# Patient Record
Sex: FEMALE | Race: WHITE
[De-identification: names, ages, dates, MRNs, and addresses within clinical notes are randomized per-mention and may not be internally consistent; named-entity substitution may affect disease eponyms.]

---

## 2018-06-05 NOTE — EDM.PDOC
ED HPI GENERAL MEDICAL PROBLEM





- General


Chief Complaint: Cardiovascular Problem


Stated Complaint: BLOOD PRESSURE


Time Seen by Provider: 06/05/18 19:37


Source of Information: Reports: Patient


History Limitations: Reports: No Limitations





- History of Present Illness


INITIAL COMMENTS - FREE TEXT/NARRATIVE: 





Presents reporting she is out of her blood pressure pills.  She states that the 

clinic was not able to get her in until June 28 and the other clinic that she 

tried in [place] would not take her insurance because it was out of network.  

Her blood pressure has been elevated at home and she has had some headaches and 

ringing in the ears because of that.  He has been on carvedilol 12.5 mg and 

hydrochlorothiazide 12.5 mg daily.  No chest pain, shortness of breath or 

swelling in the ankles.


  ** HEAD


Pain Score (Numeric/FACES): 7





- Related Data


 Allergies











Allergy/AdvReac Type Severity Reaction Status Date / Time


 


No Known Allergies Allergy   Verified 06/05/18 19:29











Home Meds: 


 Home Meds





Carvedilol 1 tab PO BEDTIME 06/05/18 [History]


Carvedilol 12.5 mg PO DAILY #30 tablet 06/05/18 [Rx]


Hydrochlorothiazide 1 tab PO DAILY 06/05/18 [History]


Hydrochlorothiazide 12.5 mg PO DAILY #30 cap 06/05/18 [Rx]











ED ROS GENERAL





- Review of Systems


Review Of Systems: ROS reveals no pertinent complaints other than HPI.





ED EXAM, GENERAL





- Physical Exam


Exam: See Below


Exam Limited By: No Limitations


General Appearance: Alert, No Apparent Distress


Ears: Normal External Exam


Nose: Normal Inspection


Throat/Mouth: Normal Inspection


Head: Atraumatic, Normocephalic


Neck: Normal Inspection


Respiratory/Chest: No Respiratory Distress, Lungs Clear, Normal Breath Sounds


Cardiovascular: Normal Peripheral Pulses, Regular Rate, Rhythm, No Edema, No 

Murmur


Peripheral Pulses: 3+: Radial (L), Radial (R), Posterior Tibial (L), Posterior 

Tibial (R)


GI/Abdominal: Soft


Neurological: Alert, Oriented, CN II-XII Intact, Normal Cognition, Normal Gait, 

No Motor/Sensory Deficits


Psychiatric: Normal Affect, Normal Mood


Skin Exam: Warm, Dry, Intact, Normal Color, No Rash


Lymphatic: No Adenopathy





Course





- Vital Signs


Last Recorded V/S: 





 Last Vital Signs











Temp  36.3 C   06/05/18 19:23


 


Pulse  79   06/05/18 19:23


 


Resp  18   06/05/18 19:23


 


BP  136/83   06/05/18 19:53


 


Pulse Ox  95   06/05/18 19:23














Departure





- Departure


Time of Disposition: 20:02


Disposition: Home, Self-Care 01


Condition: Good


Clinical Impression: 


Hypertension


Qualifiers:


 Hypertension type: essential hypertension Qualified Code(s): I10 - Essential (

primary) hypertension





Referrals: 


PCP,None [Primary Care Provider] - 


Additional Instructions: 


1.  Follow up with primary care at [facility] as previously scheduled.


2.  Take your prescribed medications.

## 2019-01-12 NOTE — EDM.PDOC
ED HPI GENERAL MEDICAL PROBLEM





- General


Chief Complaint: General


Stated Complaint: PT TAMPON INSERTED INCORRECTLY


Time Seen by Provider: 19 21:19


Source of Information: Reports: Patient


History Limitations: Reports: No Limitations





- History of Present Illness


INITIAL COMMENTS - FREE TEXT/NARRATIVE: 


HISTORY AND PHYSICAL:





History of present illness:


Patient is a 43-year-old female who presents to the emergency room today with 

complaints of retained tampon. She states this afternoon she had inserted a 

tampon and proceeded to take a shower. She was intimate shortly after and had 

forgotten that she had a tampon in place. She went to the bathroom to remove 

the tampon and was unable to find the string. States the tampon has only been 

in the vagina for approx 4-5 hours. 





She denies any fever, chills, chest pain, shortness of breath or cough. Denies 

any abdominal pain, nausea, vomiting, diarrhea, constipation or dysuria. She 

does routinely see her primary care provider and states that she did have a 

routine pelvic/Pap smear done within the last 6-8 months which was normal.





Review of systems: 


As per history of present illness and below otherwise all systems reviewed and 

negative.





Past medical history: 


As per history of present illness and as reviewed below otherwise 

noncontributory.





Surgical history: 


As per history of present illness and as reviewed below otherwise 

noncontributory.





Social history: 


See social history for further information





Family history: 


As per history of present illness and as reviewed below otherwise 

noncontributory.





Physical exam:


General: Well-developed and well-nourished 43-year-old female. Alert and 

oriented. Nontoxic appearing and in no acute distress.


HEENT: Atraumatic, normocephalic, pupils equal and reactive bilaterally, 

negative for conjunctival pallor or scleral icterus, mucous membranes moist, 

throat clear, neck supple, nontender, trachea midline. No drooling or trismus 

noted. No meningeal signs


Lungs: Clear to auscultation, breath sounds equal bilaterally, chest nontender.


Heart: S1S2, regular rate and rhythm without overt murmur


Abdomen: Soft, nondistended, nontender. Negative for masses or 

hepatosplenomegaly. Negative for costovertebral tenderness.


Pelvis: Stable nontender.


Genitourinary: This was done with consent chaperone at the bedside. Normal-

appearing external genitalia. There is a side-lying tampon in the vagina. Was 

able to remove easily without difficulty. Remainder of the pelvic exam is 

within normal limits. She tolerated well.


Rectal: Deferred.


Skin: Intact, warm, dry. No lesions or rashes noted.


Extremities: Atraumatic, negative for cords or calf pain. Neurovascular 

unremarkable.


Neuro: Awake, alert, oriented. Cranial nerves II through XII unremarkable. 

Cerebellum unremarkable. Motor and sensory unremarkable throughout. Exam 

nonfocal.





Notes:


Besides the pelvic exam and removal of the retained tampon, patient offers no 

other concerns or complaints. She declines the need for any further evaluation 

or diagnostics. Supportive care measures were reviewed and discussed. She 

voices understanding and is agreeable to plan of care. Denies any further 

questions or concerns at this time.





Diagnostics:


None





Therapeutics:


None





Prescription:


None





Impression: 


Foreign body removal of the vagina





Plan:


1. Please follow-up with your primary care provider and/or OB/GYN as needed. 


2. Return to the ED as needed and as discussed.





Definitive disposition and diagnosis as appropriate pending reevaluation and 

review of above.





  ** pelvis


Pain Score (Numeric/FACES): 5





- Related Data


 Allergies











Allergy/AdvReac Type Severity Reaction Status Date / Time


 


No Known Allergies Allergy   Verified 19 20:52











Home Meds: 


 Home Meds





Carvedilol 12.5 mg PO DAILY #30 tablet 18 [Rx]


hydroCHLOROthiazide [Hydrochlorothiazide] 12.5 mg PO DAILY #30 cap 18 [Rx]


Montelukast [Singulair] 1 tab PO DAILY 19 [History]


Pantoprazole [ProTONIX***] 1 tab PO DAILY 19 [History]


metFORMIN [Glucophage XR] 1 tab PO BID 19 [History]











Past Medical History


Cardiovascular History: Reports: Hypertension


Respiratory History: Reports: Asthma


OB/GYN History: Reports: Pregnancy


Endocrine/Metabolic History: Reports: Obesity/BMI 30+





- Past Surgical History


GI Surgical History: Reports: Cholecystectomy


Female  Surgical History: Reports:  Section


Musculoskeletal Surgical History: Reports: Arthroscopic Knee





Social & Family History





- Family History


Family Medical History: Noncontributory





- Tobacco Use


Smoking Status *Q: Never Smoker





- Recreational Drug Use


Recreational Drug Use: No





ED ROS GENERAL





- Review of Systems


Review Of Systems: ROS reveals no pertinent complaints other than HPI.





ED EXAM, GENERAL





- Physical Exam


Exam: See Below (See dictation)





Course





- Vital Signs


Last Recorded V/S: 


 Last Vital Signs











Temp  98.1 F   19 21:00


 


Pulse  92   19 21:00


 


Resp  18   19 21:00


 


BP  143/94 H  19 21:00


 


Pulse Ox  98   19 21:00














Departure





- Departure


Time of Disposition: 21:20


Disposition: Home, Self-Care 


Clinical Impression: 


Retained tampon


Qualifiers:


 Encounter type: initial encounter Qualified Code(s): T19.2XXA - Foreign body 

in vulva and vagina, initial encounter








- Discharge Information


Instructions:  Vaginal Foreign Body, Easy-to-Read


Referrals: 


PCP,None [Primary Care Provider] - 


Forms:  ED Department Discharge


Additional Instructions: 


The following information is given to patients seen in the emergency department 

who are being discharged to home. This information is to outline your options 

for follow-up care. We provide all patients seen in our emergency department 

with a follow-up referral.





The need for follow-up, as well as the timing and circumstances, are variable 

depending upon the specifics of your emergency department visit.





If you don't have a primary care physician on staff, we will provide you with a 

referral. We always advise you to contact your personal physician following an 

emergency department visit to inform them of the circumstance of the visit and 

for follow-up with them and/or the need for any referrals to a consulting 

specialist.





The emergency department will also refer you to a specialist when appropriate. 

This referral assures that you have the opportunity for follow-up care with a 

specialist. All of these measure are taken in an effort to provide you with 

optimal care, which includes your follow-up.





Under all circumstances we always encourage you to contact your private 

physician who remains a resource for coordinating your care. When calling for 

follow-up care, please make the office aware that this follow-up is from your 

recent emergency room visit. If for any reason you are refused follow-up, 

please contact the [facility] Emergency 

Department at [phone] and asked to speak to the emergency department 

charge nurse.





[facility]


Primary Care


[address] [place] [id number]


Phone: [phone]


Fax: [phone]





[facility]


[address] [place] [address]


Phone: [phone]


Fax: [phone]





1. Please follow-up with your primary care provider and/or OB/GYN as needed. 


2. Return to the ED as needed and as discussed.

## 2019-03-15 NOTE — PCM.OPNOTE
- General Post-Op/Procedure Note


Date of Surgery/Procedure: 03/15/19


Operative Procedure(s): bilateral reduction mammoplasty


Pre Op Diagnosis: bilateral macromastia


Post-Op Diagnosis: Same


Anesthesia Technique: Local, MAC


Primary Surgeon: Augusta Carrasquillo


Assistant: Eva Marti


Role of Assistant: 





retraction prepping draping and closure assistance


Complications: None


Condition: Good


Free Text/Narrative:: 


 Intake & Output











 03/15/19 03/15/19 03/15/19





 07:59 15:59 23:59


 


Intake Total  4000 


 


Output Total  410 


 


Balance  3590

## 2019-03-15 NOTE — PCM.POSTAN
POST ANESTHESIA ASSESSMENT





- MENTAL STATUS


Mental Status: Alert, Oriented





- VITAL SIGNS


Pulse Rate: 80


SaO2: 92 (2 LPM NC)


Resp Rate: 14





- RESPIRATORY


Respiratory Status: Respiratory Rate WNL, Airway Patent, O2 Saturation Stable





- CARDIOVASCULAR


CV Status: Pulse Rate WNL, Blood Pressure Stable





- GASTROINTESTINAL


GI Status: No Symptoms





- POST OP HYDRATION


Hydration Status: Adequate & Stable

## 2019-03-15 NOTE — PCM.PREANE
Preanesthetic Assessment





- Anesthesia/Transfusion/Family Hx


Anesthesia History: Prior Anesthesia Without Reaction


Family History of Anesthesia Reaction: No


Transfusion History: No Prior Transfusion(s)





- Review of Systems


General: No Symptoms


Pulmonary: No Symptoms


Cardiovascular: No Symptoms


Gastrointestinal: No Symptoms


Neurological: No Symptoms


Other: Reports: None





- Physical Assessment


NPO Status Date: 19


Height: 5 ft 2 in


Weight: 108.862 kg


ASA Class: 2


Mental Status: Alert & Oriented x3


Airway Class: Mallampati = 2


Dentition: Reports: Broken Tooth/Teeth


ROM/Head Extension: Full


Lungs: Clear to Auscultation, Normal Respiratory Effort


Cardiovascular: Regular Rate, Regular Rhythm





- Allergies


Allergies/Adverse Reactions: 


 Allergies











Allergy/AdvReac Type Severity Reaction Status Date / Time


 


No Known Allergies Allergy   Verified 19 07:52














- Blood


Blood Available: No





- Anesthesia Plan


Pre-Op Medication Ordered: None





- Acknowledgements


Anesthesia Type Planned: General Anesthesia


Pt an Appropriate Candidate for the Planned Anesthesia: Yes


Alternatives and Risks of Anesthesia Discussed w Pt/Guardian: Yes


Pt/Guardian Understands and Agrees with Anesthesia Plan: Yes


Additional Comments: 





PMH: MO, Asthma, HTN, metabolic syndrome-rx:metformin


PLAN: GET





PreAnesthesia Questionnaire


HEENT History: Reports: Allergic Rhinitis, Hard of Hearing, Impaired Vision, 

Other (See Below)


Other HEENT History: wears contacts, left hearing aid


Cardiovascular History: Reports: Hypertension


Respiratory History: Reports: Asthma


Other Respiratory History: states has not used her inhaler for years


Gastrointestinal History: Reports: GERD


Genitourinary History: Reports: None


OB/GYN History: Reports: Pregnancy


Musculoskeletal History: Reports: Neck Pain, Chronic


Neurological History: Reports: None


Psychiatric History: Reports: Anxiety, Depression


Endocrine/Metabolic History: Reports: Obesity/BMI 30+


Other Endocrine/Metabolic History: metabolic syndrome


Hematologic History: Reports: None


Immunologic History: Reports: None


Oncologic (Cancer) History: Reports: None


Dermatologic History: Reports: None





- Past Surgical History


Head Surgeries/Procedures: Reports: None


GI Surgical History: Reports: Cholecystectomy


Female  Surgical History: Reports:  Section


Neurological Surgical History: Reports: None


Musculoskeletal Surgical History: Reports: Arthroscopic Knee, Carpal Tunnel


Oncologic Surgical History: Reports: None


Dermatological Surgical History: Reports: None





- SUBSTANCE USE


Smoking Status *Q: Never Smoker


Recreational Drug Use History: No





- HOME MEDS


Home Medications: 


 Home Meds





hydroCHLOROthiazide [Hydrochlorothiazide] 12.5 mg PO DAILY #30 cap 18 [Rx]


Montelukast [Singulair] 1 tab PO DAILY 19 [History]


Pantoprazole [ProTONIX***] 1 tab PO DAILY 19 [History]


metFORMIN [Glucophage XR] 1 tab PO BID 19 [History]


Cetirizine [ZyrTEC] 10 mg PO BEDTIME 19 [History]


Levocetirizine Dihydrochloride [Xyzal] 5 mg PO DAILY 19 [History]


Sertraline HCl 100 mg PO DAILY 19 [History]


Carvedilol 12.5 mg PO BEDTIME 19 [History]











- CURRENT (IN HOUSE) MEDS


Current Meds: 





 Current Medications





Hydrocodone Bitart/Acetaminophen (Norco 325-5 Mg)  1 tab PO Q4H PRN


   PRN Reason: Pain


Lactated Ringer's (Ringers, Lactated)  1,000 mls @ 125 mls/hr IV ASDIRECTED JODY





Discontinued Medications





Bupivacaine HCl/Epinephrine Bitart (Marcaine 0.25%/Epinephrine 1:200,000)  30 

ml INJECT ONETIME ONE


   Stop: 03/15/19 08:01


Cefazolin Sodium/Dextrose 2 gm (/ Premix)  50 mls @ 100 mls/hr IV ONETIME ONE


   Stop: 03/15/19 08:29

## 2019-03-16 NOTE — PCM48HPAN
Post Anesthesia Note





- EVALUATION WITHIN 48HRS OF ANESTHETIC


Vital Signs in Normal Range: Yes


Patient Participated in Evaluation: Yes


Respiratory Function Stable: Yes


Airway Patent: Yes


Cardiovascular Function Stable: Yes


Hydration Status Stable: Yes


Pain Control Satisfactory: Yes


Nausea and Vomiting Control Satisfactory: Yes


Mental Status Recovered: Yes


Pulse Rate: 88


Resp Rate: 18


Blood Pressure: 134/73





- COMMENTS/OBSERVATIONS


Free Text/Narrative:: 


Pts nausea is improving and able to take PO fluids and solids.  Pts pain remain 

tolerable.  Pt ready for discharge.

## 2019-03-16 NOTE — OR
SURGEON:

GABO KENNEY MD

 

DATE OF PROCEDURE:  03/15/2019

 

PREOPERATIVE DIAGNOSIS:

Bilateral macromastia.

 

POSTOPERATIVE DIAGNOSIS:

Bilateral macromastia.

 

PROCEDURE:

Bilateral reduction mammoplasty.

 

ASSISTANT:

ARTIE Ravi

 

REASON FOR AND ROLE OF ASSISTANT:

Retraction, prepping, draping, positioning and closure assistance.

 

INDICATIONS:

Ms. Hernandez is a 43-year-old female with macromastia and significant symptoms

associated.  Risks and benefits were discussed with her including, but not

limited to, bleeding, infection, damage to underlying or overlying structures,

possible need for future interventions, possible scarring.  This will be done

for medical necessity.

 

PROCEDURE IN DETAIL:

After informed consent was obtained and placed on the chart, the patient was

brought to the operating theater and laid in supine position.  After adequate

general anesthesia was obtained, the area was prepped and draped, and time-out

was completed to confirm side and site.

 

Attention was then paid to time-out to confirm side and site and then the

preoperative markings done in the preanesthesia area were confirmed and just

slightly for new inferior pedicle.  Once the inferior pedicle was designed, it

was de-epithelialized using a breast tourniquet and a 15 blade taking care to

spare the nipple.  Once adequately de-epithelialized, attention was then paid to

the superior skin flaps.

The superior skin flaps were dissected 1.5 cm thick tapering to the chest wall

medially, 1.5 cm thick tapering all the way to the chest wall laterally.  They

were connected in the middle and the intervening breast tissue between this and

the inferior pedicle was removed.  The symmetry procedure was completed on the

opposite side and the skin was redraped bilaterally and stapled in position.

The patient was then sat up into the supine position and the left breast was

appreciated to be somewhat larger.  Additional adjustments were made here and

additional breast tissue was taken.  The patient was then sat up again and

symmetry was appreciated.

 

Once back in supine position, the new position of the nipple-areolar complex had

been marked while sitting up, and these were dissected and the nipple-areolar

complex was brought through and stapled in place.  Once this was completed,

attention was then paid to closure.

 

A 3-0 Monocryl Stratafix in a deep dermal fashion was used to close the dermis

and a running subcuticular 4-0 Stratafix for the skin and was used to complete

the closure.  Once this was done, the wounds were dressed with Steri-Strips.

The patient tolerated this well with some inverting of the left nipple, which

was adjusted well to pop out.  This is most likely positioning of the patient.

Once adequately dressed with fluffs, Kerlix, and a compression bra, the patient

was transferred to the PACU in stable condition.  

 

FOLLOW UP: She will be maintained with

this overnight as she is quite sleepy and has had some low 90s oxygen

saturations in PACU.  

 

 

ALMAZ / MAYELIN

DD:  03/16/2019 09:52:24

DT:  03/16/2019 13:04:08

Job #:  [id number]

BEVERLY

## 2019-04-06 NOTE — PCM.HP
H&P History of Present Illness





- General


Date of Service: 19


Admit Problem/Dx: 


 Admission Diagnosis/Problem





Admission Diagnosis/Problem      Cellulitis








Source of Information: Patient


History Limitations: Reports: No Limitations





- History of Present Illness


Initial Comments - Free Text/Narative: 





Patient noted increased redness of the right breast yesterday with some 

drainage.  She states she woke up wet and this morning feeling febrile and 

chilled.  She has recently had someone in her family with influenza type illness

, but denies any of those symptoms. She feels very tired.  Focal redness of the 

right breast at the T junction more than the left breast.  No streaking or 

signs of deeper involvement.  She tried to milk fluid for us today and there is 

nothing draining at this time.  We will try to obtain a culture of the area but 

it may be limited given the lack of any purulence or drainage.   





We discussed that there are two options - one being IV antibiotics here and 

then discharge on oral or keeping for observation overnight.  Given the 

rapidity of the onset and curious timing of 3 weeks out from surgery, this is 

quite atypical.  I would recommend coming in overnight for IV antibiotics just 

to be safe. She understands and agrees.  


Onset of Symptoms: Reports: Sudden


Duration of Symptoms: Reports: Day(s): (1)


Location: Reports: Chest (right breast)


Quality: Reports: Pressure, Throbbing


Severity: Moderate


Improves with: Reports: None


Worsens with: Reports: None


Context: Reports: Sick Contact.  Denies: Trauma


Associated Symptoms: Reports: Fever/Chills, Headaches, Malaise.  Denies: Nausea/

Vomiting, Rash, Shortness of Breath


  ** Right Breast


Pain Score (Numeric/FACES): 3





- Related Data


Allergies/Adverse Reactions: 


 Allergies











Allergy/AdvReac Type Severity Reaction Status Date / Time


 


No Known Allergies Allergy   Verified 19 13:39











Home Medications: 


 Home Meds





hydroCHLOROthiazide [Hydrochlorothiazide] 12.5 mg PO DAILY #30 cap 18 [Rx]


Montelukast [Singulair] 1 tab PO DAILY 19 [History]


Pantoprazole [ProTONIX***] 1 tab PO DAILY 19 [History]


metFORMIN [Glucophage XR] 1 tab PO BID 19 [History]


Cetirizine [ZyrTEC] 10 mg PO BEDTIME 19 [History]


Levocetirizine Dihydrochloride [Xyzal] 5 mg PO DAILY 19 [History]


Sertraline HCl 100 mg PO DAILY 19 [History]


Carvedilol 12.5 mg PO BEDTIME 19 [History]


Acetaminophen/HYDROcodone [Norco 325-5 MG] 1 tab PO Q4H PRN #30 tablet 03/15/19 

[Rx]











Past Medical History


HEENT History: Reports: Allergic Rhinitis, Hard of Hearing, Impaired Vision, 

Other (See Below)


Other HEENT History: wears contacts, left hearing aid


Cardiovascular History: Reports: Hypertension


Respiratory History: Reports: Asthma


Other Respiratory History: states has not used her inhaler for years


Gastrointestinal History: Reports: GERD


Genitourinary History: Reports: None


OB/GYN History: Reports: Pregnancy


Musculoskeletal History: Reports: Neck Pain, Chronic


Neurological History: Reports: None


Psychiatric History: Reports: Anxiety, Depression


Endocrine/Metabolic History: Reports: Obesity/BMI 30+


Other Endocrine/Metabolic History: metabolic syndrome


Hematologic History: Reports: None


Immunologic History: Reports: None


Oncologic (Cancer) History: Reports: None


Dermatologic History: Reports: None





- Infectious Disease History


Infectious Disease History: Reports: None





- Past Surgical History


Head Surgeries/Procedures: Reports: None


GI Surgical History: Reports: Cholecystectomy


Female  Surgical History: Reports:  Section


Neurological Surgical History: Reports: None


Musculoskeletal Surgical History: Reports: Arthroscopic Knee, Carpal Tunnel


Oncologic Surgical History: Reports: None


Dermatological Surgical History: Reports: None





Social & Family History





- Family History


Family Medical History: Noncontributory





- Tobacco Use


Smoking Status *Q: Never Smoker





- Caffeine Use


Caffeine Use: Reports: Coffee





- Recreational Drug Use


Recreational Drug Use: No





H&P Review of Systems





- Review of Systems:


Review Of Systems: See Below


General: Reports: Fever, Chills, Malaise, Fatigue, Night Sweats.  Denies: 

Weakness


Pulmonary: Reports: No Symptoms


Cardiovascular: Reports: No Symptoms


Musculoskeletal: Reports: No Symptoms


Skin: Reports: Erythema


Psychiatric: Reports: No Symptoms


Neurological: Reports: No Symptoms





Exam





- Exam


Exam: See Below





- Vital Signs


Vital Signs: 


 Last Vital Signs











Temp  97.1 F   19 13:39


 


Pulse  92   19 13:39


 


Resp  16   19 13:39


 


BP  136/78   19 13:39


 


Pulse Ox  97   19 13:39











Weight: 230 lb





- Exam


General: Alert, Oriented, Cooperative


HEENT: EOMI


Lungs: Normal Respiratory Effort


Cardiovascular: Regular Rate


Extremities: Normal Range of Motion


Skin: Warm, Dry, Wound (left lateral breast has the small opening we noted 

about 2cm and it is healing well.  No signs of infection here.  Left breast 

looks great.  Right breast with redness and swelling at the central t junction 

area.  Moderate redness - no drainage at this time. No open wounds - small t 

junction area with 0.5cm opening. )


Neuro Extensive - Mental Status: Alert, Oriented x3


Psychiatric: Alert, Normal Affect, Normal Mood





- Patient Data


Lab Results Last 24 hrs: 


 Laboratory Results - last 24 hr











  19 Range/Units





  14:18 14:18 14:32 


 


WBC  16.29 H    (4.0-11.0)  K/uL


 


RBC  4.30    (4.30-5.90)  M/uL


 


Hgb  12.4    (12.0-16.0)  g/dL


 


Hct  37.6    (36.0-46.0)  %


 


MCV  87.4    (80.0-98.0)  fL


 


MCH  28.8    (27.0-32.0)  pg


 


MCHC  33.0    (31.0-37.0)  g/dL


 


RDW Std Deviation  42.5    (28.0-62.0)  fl


 


RDW Coeff of Karma  13    (11.0-15.0)  %


 


Plt Count  262    (150-400)  K/uL


 


MPV  9.40    (7.40-12.00)  fL


 


Neut % (Auto)  62.1    (48.0-80.0)  %


 


Lymph % (Auto)  21.1    (16.0-40.0)  %


 


Mono % (Auto)  6.7    (0.0-15.0)  %


 


Eos % (Auto)  9.7 H    (0.0-7.0)  %


 


Baso % (Auto)  0.4    (0.0-1.5)  %


 


Neut # (Auto)  10.1 H    (1.4-5.7)  K/uL


 


Lymph # (Auto)  3.4 H    (0.6-2.4)  K/uL


 


Mono # (Auto)  1.1 H    (0.0-0.8)  K/uL


 


Eos # (Auto)  1.6 H    (0.0-0.7)  K/uL


 


Baso # (Auto)  0.1    (0.0-0.1)  K/uL


 


Nucleated RBC %  0.0    /100WBC


 


Nucleated RBCs #  0    K/uL


 


Lactate    0.9  (0.20-2.00)  mmol/L


 


Sodium   139   (136-145)  mmol/L


 


Potassium   3.4 L   (3.5-5.1)  mmol/L


 


Chloride   102   ()  mmol/L


 


Carbon Dioxide   26.8   (21.0-32.0)  mmol/L


 


BUN   14   (7.0-18.0)  mg/dL


 


Creatinine   0.9   (0.6-1.0)  mg/dL


 


Est Cr Clr Drug Dosing   76.23   mL/min


 


Estimated GFR (MDRD)   > 60.0   ml/min


 


Glucose   98   ()  mg/dL


 


Calcium   9.1   (8.5-10.1)  mg/dL


 


Total Bilirubin   0.7   (0.2-1.0)  mg/dL


 


AST   16   (15-37)  IU/L


 


ALT   32   (14-63)  IU/L


 


Alkaline Phosphatase   54   ()  U/L


 


Total Protein   7.7   (6.4-8.2)  g/dL


 


Albumin   3.6   (3.4-5.0)  g/dL


 


Globulin   4.1 H   (2.6-4.0)  g/dL


 


Albumin/Globulin Ratio   0.9   (0.9-1.6)  











Result Diagrams: 


 19 14:18





 19 14:18





*Q Meaningful Use (ADM)





- VTE *Q


VTE Anticoagulation Contraindications: Med/TX Not Indicated/Need





- VTE Risk Assess *Q


Each Risk Factor Represents 1 Point: Age 41 - 59 years


Total Score 1 Point Risk Factors: 1


Each Risk Factor Represents 2 Points: Major surgery greater than 45 minutes


Total Score 2 Point Risk Factors: 2


Each Risk Factor Represents 3 Points: None


Total Score 3 Point Risk Factors: 0


Each Risk Factor Represents 5 Points: None


Total Score 5 Point Risk Factors: 0


Venous Thromboembolism Risk Factor Score *Q: 3





- Problem List


(1) Cellulitis


SNOMED Code(s): 989866635


   ICD Code: L03.90 - CELLULITIS, UNSPECIFIED   Status: Acute   Current Visit: 

Yes   


Qualifiers: 


   Laterality: right 


Problem List Initiated/Reviewed/Updated: Yes


Orders Last 24hrs: 


 Active Orders 24 hr











 Category Date Time Status


 


 Patient Status [ADT] Stat ADT  19 16:10 Active


 


 Vital Signs [RC] PER UNIT ROUTINE Care  19 16:10 Active


 


 Regular Diet [DIET] Diet  19 Breakfast Active


 


 CULTURE BLOOD [BC] Stat Lab  19 14:32 Received


 


 CULTURE BLOOD [BC] Stat Lab  19 14:43 Received


 


 CULTURE WOUND [RM] Stat Lab  19 16:16 Ordered


 


 Acetaminophen [Tylenol] Med  19 16:10 Ordered





 650 mg PO Q6H PRN   


 


 Acetaminophen/HYDROcodone [Norco 325-5 MG] Med  19 16:10 Ordered





 1 tab PO Q4H PRN   


 


 Ibuprofen [Motrin] Med  19 16:10 Ordered





 600 mg PO Q6H PRN   


 


 Lactated Ringers [Ringers, Lactated] 1,000 ml Med  19 16:15 Ordered





 IV ASDIRECTED   


 


 Ondansetron [Zofran ODT] Med  19 16:10 Ordered





 4 mg PO Q4H PRN   


 


 Piperacillin/Tazobactam [Piperacil-Tazobact] 3.375 gm Med  19 16:15 

Ordered





 Sodium Chloride 0.9% [Normal Saline] 50 ml   





 IV Q8H   


 


 Sodium Chloride 0.9% [Saline Flush] Med  19 13:59 Active





 10 ml FLUSH ASDIRECTED PRN   


 


 Sodium Chloride 0.9% [Saline Flush] Med  19 13:59 Active





 2.5 ml FLUSH ASDIRECTED PRN   


 


 Vancomycin [Vancocin] 1 gm Med  19 16:15 Ordered





 Sodium Chloride 0.9% [Normal Saline] 250 ml   





 IV Q12H   


 


 Blood Culture x2 Reflex Set [OM.PC] Stat Oth  19 13:58 Ordered


 


 Saline Lock Insert [OM.PC] Stat Oth  19 13:58 Ordered








 Medication Orders





Acetaminophen (Tylenol)  650 mg PO Q6H PRN


   PRN Reason: Pain (mild 1-3)


Hydrocodone Bitart/Acetaminophen (Norco 325-5 Mg)  1 tab PO Q4H PRN


   PRN Reason: Pain (moderate 4-6)


Lactated Ringer's (Ringers, Lactated)  1,000 mls @ 75 mls/hr IV ASDIRECTED JODY


Piperacillin Sod/Tazobactam (Sod 3.375 gm/ Sodium Chloride)  50 mls @ 100 mls/

hr IV Q8H JODY


Vancomycin HCl 1 gm/ Sodium (Chloride)  250 mls @ 166 mls/hr IV Q12H JODY


Ibuprofen (Motrin)  600 mg PO Q6H PRN


   PRN Reason: Pain


Ondansetron HCl (Zofran Odt)  4 mg PO Q4H PRN


   PRN Reason: Nausea/Vomiting


Sodium Chloride (Saline Flush)  10 ml FLUSH ASDIRECTED PRN


   PRN Reason: Keep Vein Open


Sodium Chloride (Saline Flush)  2.5 ml FLUSH ASDIRECTED PRN


   PRN Reason: Keep Vein Open








Assessment/Plan Comment:: 





we will be very aggressive in treatment given the suspicious timing and we will 

start IV antibiotics.  


pain control


observation and normal diet


cultures obtained by physician in er at 430pm

## 2019-04-06 NOTE — PCM.SN
- Free Text/Narrative


Note: 





called for worsening cellulitis.  Marginally worse but not a tremendous amount.

  Delay in getting antibiotics started, but now has had both.  Very high 

anxiety and discussed anxiolytic but she is not interested and denies that this 

is contributing.  She has been somewhat hyper-anxiety and a higher pain level 

than most other people who have this surgery.  We discussed that this appears 

to be normal cellulitis and she can work on pain control.  She has not tried 

the ibuprofen and we will recommend this.  In addition we can try morphine if 

needed.  Recheck in am.

## 2019-04-07 NOTE — PCM.PN
- General Info


Date of Service: 04/07/19


Admission Dx/Problem (Free Text): 


 Admission Diagnosis/Problem





Admission Diagnosis/Problem      Cellulitis








Functional Status: Reports: Pain Controlled, Tolerating Diet, Ambulating, 

Urinating.  Denies: New Symptoms





- Review of Systems


General: Denies: Fever, Chills


HEENT: Reports: No Symptoms


Pulmonary: Reports: No Symptoms


Musculoskeletal: Reports: No Symptoms


Skin: Reports: Other (still redness but much improved. )





- Patient Data


Vitals - Most Recent: 


 Last Vital Signs











Temp  98.7 F   04/07/19 07:53


 


Pulse  80   04/07/19 07:53


 


Resp  16   04/07/19 07:53


 


BP  112/40 L  04/07/19 07:53


 


Pulse Ox  96   04/07/19 07:53











Weight - Most Recent: 14 lb 14 oz


I&O - Last 24 Hours: 


 Intake & Output











 04/06/19 04/07/19 04/07/19





 23:59 07:59 15:59


 


Intake Total 1300 1400 50


 


Output Total  700 


 


Balance 1300 700 50











Lab Results Last 24 Hours: 


 Laboratory Results - last 24 hr











  04/06/19 04/06/19 04/06/19 Range/Units





  14:18 14:18 14:32 


 


WBC  16.29 H    (4.0-11.0)  K/uL


 


RBC  4.30    (4.30-5.90)  M/uL


 


Hgb  12.4    (12.0-16.0)  g/dL


 


Hct  37.6    (36.0-46.0)  %


 


MCV  87.4    (80.0-98.0)  fL


 


MCH  28.8    (27.0-32.0)  pg


 


MCHC  33.0    (31.0-37.0)  g/dL


 


RDW Std Deviation  42.5    (28.0-62.0)  fl


 


RDW Coeff of Karma  13    (11.0-15.0)  %


 


Plt Count  262    (150-400)  K/uL


 


MPV  9.40    (7.40-12.00)  fL


 


Neut % (Auto)  62.1    (48.0-80.0)  %


 


Lymph % (Auto)  21.1    (16.0-40.0)  %


 


Mono % (Auto)  6.7    (0.0-15.0)  %


 


Eos % (Auto)  9.7 H    (0.0-7.0)  %


 


Baso % (Auto)  0.4    (0.0-1.5)  %


 


Neut # (Auto)  10.1 H    (1.4-5.7)  K/uL


 


Lymph # (Auto)  3.4 H    (0.6-2.4)  K/uL


 


Mono # (Auto)  1.1 H    (0.0-0.8)  K/uL


 


Eos # (Auto)  1.6 H    (0.0-0.7)  K/uL


 


Baso # (Auto)  0.1    (0.0-0.1)  K/uL


 


Nucleated RBC %  0.0    /100WBC


 


Nucleated RBCs #  0    K/uL


 


Lactate    0.9  (0.20-2.00)  mmol/L


 


Sodium   139   (136-145)  mmol/L


 


Potassium   3.4 L   (3.5-5.1)  mmol/L


 


Chloride   102   ()  mmol/L


 


Carbon Dioxide   26.8   (21.0-32.0)  mmol/L


 


BUN   14   (7.0-18.0)  mg/dL


 


Creatinine   0.9   (0.6-1.0)  mg/dL


 


Est Cr Clr Drug Dosing   76.23   mL/min


 


Estimated GFR (MDRD)   > 60.0   ml/min


 


Glucose   98   ()  mg/dL


 


Calcium   9.1   (8.5-10.1)  mg/dL


 


Total Bilirubin   0.7   (0.2-1.0)  mg/dL


 


AST   16   (15-37)  IU/L


 


ALT   32   (14-63)  IU/L


 


Alkaline Phosphatase   54   ()  U/L


 


Total Protein   7.7   (6.4-8.2)  g/dL


 


Albumin   3.6   (3.4-5.0)  g/dL


 


Globulin   4.1 H   (2.6-4.0)  g/dL


 


Albumin/Globulin Ratio   0.9   (0.9-1.6)  














  04/07/19 Range/Units





  08:38 


 


WBC  12.44 H  (4.0-11.0)  K/uL


 


RBC  3.81 L  (4.30-5.90)  M/uL


 


Hgb  11.1 L  (12.0-16.0)  g/dL


 


Hct  33.8 L  (36.0-46.0)  %


 


MCV  88.7  (80.0-98.0)  fL


 


MCH  29.1  (27.0-32.0)  pg


 


MCHC  32.8  (31.0-37.0)  g/dL


 


RDW Std Deviation  42.9  (28.0-62.0)  fl


 


RDW Coeff of Karma  13  (11.0-15.0)  %


 


Plt Count  232  (150-400)  K/uL


 


MPV  8.60  (7.40-12.00)  fL


 


Neut % (Auto)  59.2  (48.0-80.0)  %


 


Lymph % (Auto)  19.6  (16.0-40.0)  %


 


Mono % (Auto)  4.8  (0.0-15.0)  %


 


Eos % (Auto)  16.2 H  (0.0-7.0)  %


 


Baso % (Auto)  0.2  (0.0-1.5)  %


 


Neut # (Auto)  7.4 H  (1.4-5.7)  K/uL


 


Lymph # (Auto)  2.4  (0.6-2.4)  K/uL


 


Mono # (Auto)  0.6  (0.0-0.8)  K/uL


 


Eos # (Auto)  2.0 H  (0.0-0.7)  K/uL


 


Baso # (Auto)  0.0  (0.0-0.1)  K/uL


 


Nucleated RBC %  0.0  /100WBC


 


Nucleated RBCs #  0  K/uL


 


Lactate   (0.20-2.00)  mmol/L


 


Sodium   (136-145)  mmol/L


 


Potassium   (3.5-5.1)  mmol/L


 


Chloride   ()  mmol/L


 


Carbon Dioxide   (21.0-32.0)  mmol/L


 


BUN   (7.0-18.0)  mg/dL


 


Creatinine   (0.6-1.0)  mg/dL


 


Est Cr Clr Drug Dosing   mL/min


 


Estimated GFR (MDRD)   ml/min


 


Glucose   ()  mg/dL


 


Calcium   (8.5-10.1)  mg/dL


 


Total Bilirubin   (0.2-1.0)  mg/dL


 


AST   (15-37)  IU/L


 


ALT   (14-63)  IU/L


 


Alkaline Phosphatase   ()  U/L


 


Total Protein   (6.4-8.2)  g/dL


 


Albumin   (3.4-5.0)  g/dL


 


Globulin   (2.6-4.0)  g/dL


 


Albumin/Globulin Ratio   (0.9-1.6)  











Galileo Results Last 24 Hours: 


 Microbiology











 04/06/19 16:30 Wound Culture - Preliminary





 Breast, Right 











Med Orders - Current: 


 Current Medications





Acetaminophen (Tylenol)  650 mg PO Q6H PRN


   PRN Reason: Pain (mild 1-3)


   Last Admin: 04/06/19 18:17 Dose:  650 mg


Hydrocodone Bitart/Acetaminophen (Norco 325-5 Mg)  1 tab PO Q4H PRN


   PRN Reason: Pain (moderate 4-6)


   Last Admin: 04/06/19 20:14 Dose:  1 tab


Carvedilol (Coreg)  12.5 mg PO BEDTIME JODY


   Last Admin: 04/06/19 23:11 Dose:  12.5 mg


Diphenhydramine HCl (Benadryl)  25 mg PO Q6H PRN


   PRN Reason: Itching


   Last Admin: 04/07/19 07:15 Dose:  25 mg


Lactated Ringer's (Ringers, Lactated)  1,000 mls @ 75 mls/hr IV ASDIRECTED [facility]


   Last Admin: 04/07/19 10:00 Dose:  75 mls/hr


Piperacillin Sod/Tazobactam (Sod 3.375 gm/ Sodium Chloride)  50 mls @ 100 mls/

hr IV Q8H [facility]


   Last Admin: 04/07/19 09:07 Dose:  100 mls/hr


Vancomycin HCl 1 gm/ Sodium (Chloride)  250 mls @ 166 mls/hr IV BID@0600,1800 

[facility]


   Last Admin: 04/07/19 05:49 Dose:  166 mls/hr


Ibuprofen (Motrin)  600 mg PO Q6H PRN


   PRN Reason: Pain


   Last Admin: 04/06/19 23:08 Dose:  600 mg


Montelukast Sodium (Singulair)  10 mg PO BEDTIME [facility]


   Last Admin: 04/06/19 23:09 Dose:  10 mg


Morphine Sulfate (Morphine)  0.5 mg IVPUSH Q2H PRN


   PRN Reason: Pain


Ondansetron HCl (Zofran Odt)  4 mg PO Q4H PRN


   PRN Reason: Nausea/Vomiting


Sodium Chloride (Saline Flush)  10 ml FLUSH ASDIRECTED PRN


   PRN Reason: Keep Vein Open


Sodium Chloride (Saline Flush)  2.5 ml FLUSH ASDIRECTED PRN


   PRN Reason: Keep Vein Open





Discontinued Medications





Sodium Chloride (Normal Saline)  1,000 mls @ 999 mls/hr IV .Bolus ONE


   Stop: 04/06/19 14:59


   Last Admin: 04/06/19 14:20 Dose:  999 mls/hr


Vancomycin HCl 1 gm/ Sodium (Chloride)  250 mls @ 166 mls/hr IV Q12H [facility]


   Last Admin: 04/06/19 21:15 Dose:  Not Given


Morphine Sulfate (Morphine)  0.5 mg IVPUSH Q2H [facility]


   Last Admin: 04/06/19 23:09 Dose:  0.5 mg











- Exam


General: Alert, Oriented, Cooperative


HEENT: Pupils Equal


Lungs: Normal Respiratory Effort


Extremities: Normal Inspection, Normal Range of Motion


Wound/Incisions: Erythema Improving (significantly better than even late last 

night.  Softening of the skin.  Discussed min-mod draiange to be serosanguinous 

and not frankly purulent and no signs of abscess formation.  )


Neurological: No New Focal Deficit, Cranial Nerves Intact


Psy/Mental Status: Alert, Normal Affect, Anxious





- Problem List & Annotations


(1) Cellulitis


SNOMED Code(s): 786299845


   Code(s): L03.90 - CELLULITIS, UNSPECIFIED   Status: Acute   Current Visit: 

Yes   


Qualifiers: 


   Laterality: right 





- Problem List Review


Problem List Initiated/Reviewed/Updated: Yes





- My Orders


Last 24 Hours: 


My Active Orders





04/06/19 16:10


Patient Status [ADT] Stat 


Vital Signs [RC] Q4H 


Acetaminophen [Tylenol]   650 mg PO Q6H PRN 


Acetaminophen/HYDROcodone [Norco 325-5 MG]   1 tab PO Q4H PRN 


Ibuprofen [Motrin]   600 mg PO Q6H PRN 


Ondansetron [Zofran ODT]   4 mg PO Q4H PRN 





04/06/19 16:15


Lactated Ringers [Ringers, Lactated] 1,000 ml IV ASDIRECTED 





04/06/19 16:30


CULTURE WOUND [RM] Stat 





04/06/19 17:00


Piperacillin/Tazobactam [Piperacil-Tazobact] 3.375 gm   Sodium Chloride 0.9% [

Normal Saline] 50 ml IV Q8H 





04/06/19 18:45


Vancomycin [Vancocin] 1 gm   Sodium Chloride 0.9% [Normal Saline] 250 ml IV BID@

0600,1800 





04/06/19 21:00


Carvedilol [Coreg]   12.5 mg PO BEDTIME 


Montelukast [Singulair]   10 mg PO BEDTIME 





04/06/19 22:38


diphenhydrAMINE [Benadryl]   25 mg PO Q6H PRN 





04/07/19 00:04


Morphine   0.5 mg IVPUSH Q2H PRN 














- Plan


Plan:: 





continue IV antibiotics pending sensitivities. Likely discharge tomorrow on 

oral antibiotics.   


pain control


observation and normal diet

## 2019-04-08 NOTE — PCM.SN
- Free Text/Narrative


Note: 





recheck this am and though the right breast is significantly better there is 

some worsening of the left breast.  This is curious given the significant 

improvements on the opposite side.  This grew pan sensitive staph.  





My recommendation is opening of the focalized redness where the incision is to 

ensure there is no abscess - it is quite firm here and I anticipate there will 

be some fluid much like the right has had some drainage.  We discussed imaging 

as well if needed, but I would proceed with the opening of the focal area prior 

to any additional scans. She would like to do this as well. 





We will return around 4pm and dose the morphine, norco and use some topical LET 

around that time as well. All questions answered.

## 2019-04-09 NOTE — PCM.PN
- General Info


Date of Service: 04/08/19


Admission Dx/Problem (Free Text): 


 Admission Diagnosis/Problem





Admission Diagnosis/Problem      Cellulitis








Functional Status: Reports: Pain Controlled, Tolerating Diet





- Review of Systems


General: Reports: No Symptoms


HEENT: Reports: No Symptoms


Musculoskeletal: Reports: No Symptoms


Skin: Reports: Other (redness on the right much improved, left has increased. )


Neurological: Reports: No Symptoms


Psychiatric: Reports: No Symptoms





- Patient Data


Vitals - Most Recent: 


 Last Vital Signs











Temp  97.3 F   04/09/19 11:54


 


Pulse  73   04/09/19 11:54


 


Resp  14   04/09/19 11:54


 


BP  103/58 L  04/09/19 11:54


 


Pulse Ox  96   04/09/19 11:54











Weight - Most Recent: 238 lb


I&O - Last 24 Hours: 


 Intake & Output











 04/08/19 04/09/19 04/09/19





 23:59 07:59 15:59


 


Intake Total 970 340 


 


Balance 970 340 











Galileo Results Last 24 Hours: 


 Microbiology











 04/06/19 14:43 Aerobic Blood Culture - Preliminary





 Blood - Venous - Lab Draw    NO GROWTH AFTER 2 DAYS





 Anaerobic Blood Culture - Preliminary





    NO GROWTH AFTER 2 DAYS


 


 04/06/19 14:32 Aerobic Blood Culture - Preliminary





 Blood - Venous    NO GROWTH AFTER 2 DAYS





 Anaerobic Blood Culture - Preliminary





    NO GROWTH AFTER 2 DAYS


 


 04/06/19 16:30 Wound Culture - Final





 Breast, Right    Staphylococcus Aureus











Med Orders - Current: 


 Current Medications





Acetaminophen (Tylenol)  650 mg PO Q6H PRN


   PRN Reason: Pain (mild 1-3)


   Last Admin: 04/06/19 18:17 Dose:  650 mg


Hydrocodone Bitart/Acetaminophen (Norco 325-5 Mg)  0 tab PO Q4H PRN


   PRN Reason: Pain (moderate 4-6)


   Last Admin: 04/09/19 08:18 Dose:  2 tab


Carvedilol (Coreg)  12.5 mg PO BEDTIME [facility]


   Last Admin: 04/08/19 22:04 Dose:  12.5 mg


Cetirizine HCl (Zyrtec)  10 mg PO BEDTIME [facility]


   Last Admin: 04/08/19 22:04 Dose:  10 mg


Diphenhydramine HCl (Benadryl)  25 mg PO Q6H PRN


   PRN Reason: Itching


   Last Admin: 04/08/19 16:57 Dose:  25 mg


Hydrochlorothiazide (Hydrochlorothiazide)  12.5 mg PO DAILY [facility]


   Last Admin: 04/09/19 08:19 Dose:  12.5 mg


Lactated Ringer's (Ringers, Lactated)  1,000 mls @ 75 mls/hr IV ASDIRECTED [facility]


   Last Admin: 04/07/19 10:00 Dose:  75 mls/hr


Vancomycin HCl 1 gm/ Sodium (Chloride)  250 mls @ 166 mls/hr IV BID@0600,1800 

[facility]


   Last Admin: 04/09/19 06:45 Dose:  166 mls/hr


Piperacillin Sod/Tazobactam (Sod 3.375 gm/ Sodium Chloride)  50 mls @ 100 mls/

hr IV Q6H [facility]


   Last Admin: 04/09/19 08:39 Dose:  100 mls/hr


Ibuprofen (Motrin)  600 mg PO Q6H PRN


   PRN Reason: Pain


   Last Admin: 04/07/19 16:53 Dose:  600 mg


Metformin HCl (Glucophage Xr)  500 mg PO BID [facility]


   Last Admin: 04/09/19 08:19 Dose:  500 mg


Montelukast Sodium (Singulair)  10 mg PO BEDTIME [facility]


   Last Admin: 04/08/19 22:04 Dose:  10 mg


Montelukast Sodium (Singulair)  10 mg PO DAILY [facility]


   Last Admin: 04/09/19 08:20 Dose:  10 mg


Morphine Sulfate (Morphine)  0.5 mg IVPUSH Q2H PRN


   PRN Reason: Pain


   Last Admin: 04/08/19 16:47 Dose:  0.5 mg


Ondansetron HCl (Zofran Odt)  4 mg PO Q4H PRN


   PRN Reason: Nausea/Vomiting


Pantoprazole Sodium (Protonix***)  40 mg PO DAILY [facility]


   Last Admin: 04/09/19 08:19 Dose:  40 mg


Levocetirizine Dihydrochloride [ Xyzal] 5 Mg  1 each PO DAILY [facility]


   Last Admin: 04/09/19 08:25 Dose:  Not Given


Sertraline HCl (Zoloft)  100 mg PO DAILY [facility]


   Last Admin: 04/09/19 08:19 Dose:  100 mg


Sodium Chloride (Saline Flush)  10 ml FLUSH ASDIRECTED PRN


   PRN Reason: Keep Vein Open


Sodium Chloride (Saline Flush)  2.5 ml FLUSH ASDIRECTED PRN


   PRN Reason: Keep Vein Open





Discontinued Medications





Hydrocodone Bitart/Acetaminophen (Norco 325-5 Mg)  1 tab PO Q4H PRN


   PRN Reason: Pain (moderate 4-6)


   Last Admin: 04/08/19 04:09 Dose:  2 tab


Hydrocodone Bitart/Acetaminophen (Norco 325-5 Mg)  2 tab PO Q4H PRN


   PRN Reason: Pain (moderate 4-6)


Sodium Chloride (Normal Saline)  1,000 mls @ 999 mls/hr IV .Bolus ONE


   Stop: 04/06/19 14:59


   Last Admin: 04/06/19 14:20 Dose:  999 mls/hr


Piperacillin Sod/Tazobactam (Sod 3.375 gm/ Sodium Chloride)  50 mls @ 100 mls/

hr IV Q8H [facility]


   Last Admin: 04/09/19 01:00 Dose:  100 mls/hr


Vancomycin HCl 1 gm/ Sodium (Chloride)  250 mls @ 166 mls/hr IV Q12H [facility]


   Last Admin: 04/06/19 21:15 Dose:  Not Given


Vancomycin HCl 1 gm/ Sodium (Chloride)  250 mls @ 166 mls/hr IV ONETIME [facility]


   Stop: 04/07/19 19:30


   Last Admin: 04/07/19 18:08 Dose:  166 mls/hr


Lidocaine/Prilocaine (Emla Crm)  1 gm TOP ASDIRECTED ONE


   Stop: 04/08/19 15:31


   Last Admin: 04/08/19 16:04 Dose:  1 applic


Morphine Sulfate (Morphine)  0.5 mg IVPUSH Q2H [facility]


   Last Admin: 04/06/19 23:09 Dose:  0.5 mg











- Exam


General: Alert, Oriented, Cooperative


HEENT: EOMI


Lungs: Normal Respiratory Effort


Skin: Warm, Dry


Wound/Incisions: Erythema (on the right is improving on the left is worse and 

more focal to the NAC area.  )


Neurological: No New Focal Deficit


Psy/Mental Status: Alert, Normal Affect, Normal Mood





- Problem List & Annotations


(1) Cellulitis


SNOMED Code(s): 668833765


   Code(s): L03.90 - CELLULITIS, UNSPECIFIED   Status: Acute   Current Visit: 

Yes   


Qualifiers: 


   Laterality: right 





- Problem List Review


Problem List Initiated/Reviewed/Updated: Yes





- My Orders


Last 24 Hours: 


My Active Orders





04/08/19 16:50


CULTURE WOUND [RM] Routine 





04/09/19 07:30


Piperacillin/Tazobactam [Piperacil-Tazobact] 3.375 gm   Sodium Chloride 0.9% [

Normal Saline] 50 ml IV Q6H 














- Plan


Plan:: 





Incision and drainage today 4/8 - significant purulence in the local area.  

Captured and sent for repeat culture to ensure same species given onset and 

worsening despite the resolution on the right breast.  Abscess could do that 

but quite strange to arise while on antibiotics.  


Pain control


Normal Diet

## 2019-04-09 NOTE — PCM.OPNOTE
- General Post-Op/Procedure Note


Date of Surgery/Procedure: 04/08/19


Operative Procedure(s): incision and drainage of abscess left breast


Pre Op Diagnosis: left breast abscess


Post-Op Diagnosis: Same


Anesthesia Technique: Local


Primary Surgeon: Augusta Carrasquillo


Complications: None


Condition: Stable


Free Text/Narrative:: 


 Intake & Output











 04/08/19 04/09/19 04/09/19





 23:59 07:59 15:59


 


Intake Total 970 340 


 


Balance 970 340 








I and D of left breast - previous incision opened and purulence noted.  Sent 

for culture - about 2-3cc total.

## 2019-04-09 NOTE — PCM.PN
- General Info


Date of Service: 04/09/19


Admission Dx/Problem (Free Text): 


 Admission Diagnosis/Problem





Admission Diagnosis/Problem      Cellulitis








Functional Status: Reports: Pain Controlled, Tolerating Diet





- Review of Systems


General: Reports: No Symptoms


HEENT: Reports: No Symptoms


Skin: Reports: Other (redness much improved. )





- Patient Data


Vitals - Most Recent: 


 Last Vital Signs











Temp  97.3 F   04/09/19 11:54


 


Pulse  73   04/09/19 11:54


 


Resp  14   04/09/19 11:54


 


BP  103/58 L  04/09/19 11:54


 


Pulse Ox  96   04/09/19 11:54











Weight - Most Recent: 238 lb


I&O - Last 24 Hours: 


 Intake & Output











 04/08/19 04/09/19 04/09/19





 23:59 07:59 15:59


 


Intake Total 970 340 


 


Balance 970 340 











Galileo Results Last 24 Hours: 


 Microbiology











 04/06/19 14:43 Aerobic Blood Culture - Preliminary





 Blood - Venous - Lab Draw    NO GROWTH AFTER 2 DAYS





 Anaerobic Blood Culture - Preliminary





    NO GROWTH AFTER 2 DAYS


 


 04/06/19 14:32 Aerobic Blood Culture - Preliminary





 Blood - Venous    NO GROWTH AFTER 2 DAYS





 Anaerobic Blood Culture - Preliminary





    NO GROWTH AFTER 2 DAYS


 


 04/06/19 16:30 Wound Culture - Final





 Breast, Right    Staphylococcus Aureus











Med Orders - Current: 


 Current Medications





Acetaminophen (Tylenol)  650 mg PO Q6H PRN


   PRN Reason: Pain (mild 1-3)


   Last Admin: 04/06/19 18:17 Dose:  650 mg


Hydrocodone Bitart/Acetaminophen (Norco 325-5 Mg)  0 tab PO Q4H PRN


   PRN Reason: Pain (moderate 4-6)


   Last Admin: 04/09/19 08:18 Dose:  2 tab


Carvedilol (Coreg)  12.5 mg PO BEDTIME [facility]


   Last Admin: 04/08/19 22:04 Dose:  12.5 mg


Cetirizine HCl (Zyrtec)  10 mg PO BEDTIME [facility]


   Last Admin: 04/08/19 22:04 Dose:  10 mg


Diphenhydramine HCl (Benadryl)  25 mg PO Q6H PRN


   PRN Reason: Itching


   Last Admin: 04/08/19 16:57 Dose:  25 mg


Hydrochlorothiazide (Hydrochlorothiazide)  12.5 mg PO DAILY [facility]


   Last Admin: 04/09/19 08:19 Dose:  12.5 mg


Lactated Ringer's (Ringers, Lactated)  1,000 mls @ 75 mls/hr IV ASDIRECTED [facility]


   Last Admin: 04/07/19 10:00 Dose:  75 mls/hr


Vancomycin HCl 1 gm/ Sodium (Chloride)  250 mls @ 166 mls/hr IV BID@0600,1800 

[facility]


   Last Admin: 04/09/19 06:45 Dose:  166 mls/hr


Piperacillin Sod/Tazobactam (Sod 3.375 gm/ Sodium Chloride)  50 mls @ 100 mls/

hr IV Q6H [facility]


   Last Admin: 04/09/19 08:39 Dose:  100 mls/hr


Ibuprofen (Motrin)  600 mg PO Q6H PRN


   PRN Reason: Pain


   Last Admin: 04/07/19 16:53 Dose:  600 mg


Metformin HCl (Glucophage Xr)  500 mg PO BID [facility]


   Last Admin: 04/09/19 08:19 Dose:  500 mg


Montelukast Sodium (Singulair)  10 mg PO BEDTIME [facility]


   Last Admin: 04/08/19 22:04 Dose:  10 mg


Montelukast Sodium (Singulair)  10 mg PO DAILY [facility]


   Last Admin: 04/09/19 08:20 Dose:  10 mg


Morphine Sulfate (Morphine)  0.5 mg IVPUSH Q2H PRN


   PRN Reason: Pain


   Last Admin: 04/08/19 16:47 Dose:  0.5 mg


Ondansetron HCl (Zofran Odt)  4 mg PO Q4H PRN


   PRN Reason: Nausea/Vomiting


Pantoprazole Sodium (Protonix***)  40 mg PO DAILY [facility]


   Last Admin: 04/09/19 08:19 Dose:  40 mg


Levocetirizine Dihydrochloride [ Xyzal] 5 Mg  1 each PO DAILY [facility]


   Last Admin: 04/09/19 08:25 Dose:  Not Given


Sertraline HCl (Zoloft)  100 mg PO DAILY [facility]


   Last Admin: 04/09/19 08:19 Dose:  100 mg


Sodium Chloride (Saline Flush)  10 ml FLUSH ASDIRECTED PRN


   PRN Reason: Keep Vein Open


Sodium Chloride (Saline Flush)  2.5 ml FLUSH ASDIRECTED PRN


   PRN Reason: Keep Vein Open





Discontinued Medications





Hydrocodone Bitart/Acetaminophen (Norco 325-5 Mg)  1 tab PO Q4H PRN


   PRN Reason: Pain (moderate 4-6)


   Last Admin: 04/08/19 04:09 Dose:  2 tab


Hydrocodone Bitart/Acetaminophen (Norco 325-5 Mg)  2 tab PO Q4H PRN


   PRN Reason: Pain (moderate 4-6)


Sodium Chloride (Normal Saline)  1,000 mls @ 999 mls/hr IV .Bolus ONE


   Stop: 04/06/19 14:59


   Last Admin: 04/06/19 14:20 Dose:  999 mls/hr


Piperacillin Sod/Tazobactam (Sod 3.375 gm/ Sodium Chloride)  50 mls @ 100 mls/

hr IV Q8H [facility]


   Last Admin: 04/09/19 01:00 Dose:  100 mls/hr


Vancomycin HCl 1 gm/ Sodium (Chloride)  250 mls @ 166 mls/hr IV Q12H [facility]


   Last Admin: 04/06/19 21:15 Dose:  Not Given


Vancomycin HCl 1 gm/ Sodium (Chloride)  250 mls @ 166 mls/hr IV ONETIME [facility]


   Stop: 04/07/19 19:30


   Last Admin: 04/07/19 18:08 Dose:  166 mls/hr


Lidocaine/Prilocaine (Emla Crm)  1 gm TOP ASDIRECTED ONE


   Stop: 04/08/19 15:31


   Last Admin: 04/08/19 16:04 Dose:  1 applic


Morphine Sulfate (Morphine)  0.5 mg IVPUSH Q2H [facility]


   Last Admin: 04/06/19 23:09 Dose:  0.5 mg











- Exam


General: Alert, Oriented, Cooperative


HEENT: EOMI


Lungs: Normal Respiratory Effort


Wound/Incisions: Erythema Improving (on bilateral breasts. Excellent progress. )


Psy/Mental Status: Alert, Normal Affect, Normal Mood





- Problem List & Annotations


(1) Cellulitis


SNOMED Code(s): 461607091


   Code(s): L03.90 - CELLULITIS, UNSPECIFIED   Status: Acute   Current Visit: 

Yes   


Qualifiers: 


   Laterality: right 





- Problem List Review


Problem List Initiated/Reviewed/Updated: Yes





- My Orders


Last 24 Hours: 


My Active Orders





04/08/19 16:50


CULTURE WOUND [RM] Routine 





04/09/19 07:30


Piperacillin/Tazobactam [Piperacil-Tazobact] 3.375 gm   Sodium Chloride 0.9% [

Normal Saline] 50 ml IV Q6H 














- Plan


Plan:: 





Incision and drainage yesterday 4/8 - pending culture and much improved.  Very 

atypical presentation 3 weeks post op and to see improvement on the right with 

worsening and onset on the left.  There was an abscess that has been drained 

and we do see improvement.  Would like culture results before discharge to 

ensure same species.  If different species would wait for sensitivities before 

discharge.  Anticipate staph a. 


Pain control continues and doing much better.  D/c morphine and use only orals. 


Continue ambulation and PAS boots.  Needs to ambulate more and discussed blood 

clot risk and keeping boots on while doing range of motion with legs. 


Normal Diet

## 2019-04-16 NOTE — OR
SURGEON:

GABO KENNEY MD

 

DATE OF PROCEDURE:  04/08/2019

 

PREOPERATIVE DIAGNOSIS:

Left breast abscess.

 

POSTOPERATIVE DIAGNOSIS:

Left breast abscess.

 

PROCEDURE:

Incision and drainage of left breast abscess.

 

ANESTHESIA:

Local.

 

INDICATIONS:

Ms. Hernandez is seen today and has been hospitalized on IV antibiotics for

cellulitis.  There was some increased localization around the left nipple-

areolar complex and we discussed incision and drainage.  All questions were

answered and she would like to proceed.

 

PROCEDURE IN DETAIL:

After adequate local anesthesia with topical, a small incision was made at the

nipple-areolar complex previous incision that was opened.  A small amount of

purulence was noted approximately 2 to 3 mL total.  This was collected and sent

for culture.  It was irrigated and packed with some packing material.  She

tolerated this well and was dressed with gauze.  All counts and needles were

correct at the end of the case.

 

FOLLOWUP INSTRUCTIONS:

The patient will be continued in the hospital on IV antibiotics.

 

 

HEGGTHE / MODL

DD:  04/16/2019 12:15:47

DT:  04/16/2019 15:32:10

Job #:  [id number]

## 2019-09-17 ENCOUNTER — HOSPITAL ENCOUNTER (EMERGENCY)
Dept: HOSPITAL 56 - MW.ED | Age: 44
Discharge: HOME | End: 2019-09-17
Payer: COMMERCIAL

## 2019-09-17 DIAGNOSIS — I10: ICD-10-CM

## 2019-09-17 DIAGNOSIS — M79.605: Primary | ICD-10-CM

## 2019-09-17 DIAGNOSIS — E66.9: ICD-10-CM

## 2019-09-17 DIAGNOSIS — Z79.899: ICD-10-CM

## 2019-09-17 DIAGNOSIS — Z79.84: ICD-10-CM

## 2019-09-17 DIAGNOSIS — K21.9: ICD-10-CM

## 2019-09-17 DIAGNOSIS — Z90.49: ICD-10-CM

## 2019-09-17 LAB
BUN SERPL-MCNC: 15 MG/DL (ref 7–18)
CHLORIDE SERPL-SCNC: 102 MMOL/L (ref 98–107)
CO2 SERPL-SCNC: 24.1 MMOL/L (ref 21–32)
GLUCOSE SERPL-MCNC: 152 MG/DL (ref 74–106)
POTASSIUM SERPL-SCNC: 3.7 MMOL/L (ref 3.5–5.1)
SODIUM SERPL-SCNC: 137 MMOL/L (ref 136–145)

## 2019-09-17 PROCEDURE — 86140 C-REACTIVE PROTEIN: CPT

## 2019-09-17 PROCEDURE — 36415 COLL VENOUS BLD VENIPUNCTURE: CPT

## 2019-09-17 PROCEDURE — 85652 RBC SED RATE AUTOMATED: CPT

## 2019-09-17 PROCEDURE — 85025 COMPLETE CBC W/AUTO DIFF WBC: CPT

## 2019-09-17 PROCEDURE — 73562 X-RAY EXAM OF KNEE 3: CPT

## 2019-09-17 PROCEDURE — 80053 COMPREHEN METABOLIC PANEL: CPT

## 2019-09-17 PROCEDURE — 85610 PROTHROMBIN TIME: CPT

## 2019-09-17 PROCEDURE — 93971 EXTREMITY STUDY: CPT

## 2019-09-17 PROCEDURE — 73502 X-RAY EXAM HIP UNI 2-3 VIEWS: CPT

## 2019-09-17 PROCEDURE — 99284 EMERGENCY DEPT VISIT MOD MDM: CPT

## 2019-09-17 NOTE — US
Left lower extremity deep venous ultrasound: Duplex and color Doppler imaging 
was obtained of the left common femoral, superficial femoral, popliteal, 
posterior tibial veins and peroneal vein.



Normal phasic flow, augmentation and compression is seen.



Impression:

No evidence of deep venous thrombosis within the left lower extremity.



Diagnostic code #1

MTDD

## 2019-09-17 NOTE — CR
INDICATION:



Pain left knee.



COMPARISON:



None.



TECHNIQUE:



Three view study left knee.



FINDINGS:



No evidence of fracture or dislocation. No bone or soft tissue 

abnormalities. No evidence of suprapatellar synovial effusion.



IMPRESSION:



Negative radiographic examination of the left knee.



Dictated by Alyce Miller MD @ Sep 17 2019 11:46AM



Signed by Dr. Alyce Miller @ Sep 17 2019 11:48AM

## 2019-09-17 NOTE — EDM.PDOC
ED HPI GENERAL MEDICAL PROBLEM





- General


Chief Complaint: Lower Extremity Injury/Pain


Stated Complaint: LEFT LEG PAIN


Time Seen by Provider: 19 10:16


Source of Information: Reports: Patient


History Limitations: Reports: No Limitations





- History of Present Illness


INITIAL COMMENTS - FREE TEXT/NARRATIVE: 


HISTORY AND PHYSICAL:





History of present illness:


Patient is a 44-year-old female who presents to the emergency room with 

complaints of left lower extremity pain x 2 weeks. She feels that the pain 

originates at the left knee and proximal posterior calf. She has the sensation 

that there is "a lump" to the left posterior calf which she states fluctuates 

in size. Currently does not have any pain in the posterior calf and cannot 

palpate/locate the "lump". Pain radiates up her quadricep and hamstring into 

the left hip and also down her calf muscle into her foot. She denies any injury

, trauma or falls. She states it is painful to weight-bear and feels that she 

is having to "limp around". 





Patient denies any fever, chills, headache, change in vision, syncope or near 

syncope. Denies any chest pain, back pain, shortness of breath or cough. Denies 

any abdominal pain, nausea, vomiting, diarrhea, constipation or dysuria. Has 

not noted any blood in urine or stool. Patient has been eating and drinking 

appropriately.





Review of systems: 


As per history of present illness and below otherwise all systems reviewed and 

negative.





Past medical history: 


As per history of present illness and as reviewed below otherwise 

noncontributory.





Surgical history: 


As per history of present illness and as reviewed below otherwise 

noncontributory.





Social history: 


See social history for further information





Family history: 


As per history of present illness and as reviewed below otherwise 

noncontributory.





Physical exam:


General: Well-developed and well-nourished 44-year-old female. Alert and 

oriented. Nontoxic appearing and in no acute distress.


HEENT: Atraumatic, normocephalic, pupils equal and reactive bilaterally, 

negative for conjunctival pallor or scleral icterus, mucous membranes moist, 

TMs normal bilaterally, throat clear, neck supple, nontender, trachea midline. 

No drooling or trismus noted. No meningeal signs. No hot potato voice noted. 


Lungs: Clear to auscultation, breath sounds equal bilaterally, chest nontender.


Heart: S1S2, regular rate and rhythm without overt murmur


Abdomen: Soft, nondistended, nontender. Negative for masses or 

hepatosplenomegaly. Negative for costovertebral tenderness.


Pelvis: Stable nontender.


Skin: Intact, warm, dry. No lesions or rashes noted.


Extremities: Atraumatic, moves all extremities per self without difficulty or 

deficits, negative for cords or calf pain. Neurovascular unremarkable.


Neuro: Awake, alert, oriented. Cranial nerves II through XII unremarkable. 

Cerebellum unremarkable. Motor and sensory unremarkable throughout. Exam 

nonfocal.





Notes:


X-ray of the knee and hip are negative. Lab work is unremarkable. The 

ultrasound shows no evidence of DVT. All diagnostics were shared with the 

patient. We discussed the need for appropriate follow-up for further evaluation 

and management of this complaint. Supportive care measures were reviewed and 

discussed. Voices understanding and is agreeable to plan of care. Denies any 

further questions or concerns at this time.





Diagnostics:


CBC, CMP, INR, CRP, ESR, knee x-ray, hip/pelvis x-ray and venous ultrasound





Therapeutics:


Norco, Zofran, knee brace, crutches





Prescription:


Norco (#15)





Impression: 


Left lower extremity pain





Plan:


1. Rest, ice, elevate the affected extremity. Please wear the splint as 

directed.


2. Tylenol and/or Ibuprofen as needed for pain management. 


3. Follow up with the Orthopedic provider as we discussed. Return to the ED as 

needed and as discussed.





Definitive disposition and diagnosis as appropriate pending reevaluation and 

review of above.





  ** left lower leg


Pain Score (Numeric/FACES): 9





- Related Data


 Allergies











Allergy/AdvReac Type Severity Reaction Status Date / Time


 


No Known Allergies Allergy   Verified 19 10:22











Home Meds: 


 Home Meds





hydroCHLOROthiazide [Hydrochlorothiazide] 12.5 mg PO DAILY #30 cap 18 [Rx]


Montelukast [Singulair] 1 tab PO DAILY 19 [History]


Pantoprazole [ProTONIX***] 1 tab PO DAILY 19 [History]


metFORMIN [Glucophage XR] 1 tab PO BID 19 [History]


Cetirizine [ZyrTEC] 10 mg PO BEDTIME 19 [History]


Levocetirizine Dihydrochloride [Xyzal] 5 mg PO DAILY 19 [History]


Sertraline HCl 100 mg PO DAILY 19 [History]


Carvedilol 12.5 mg PO BEDTIME 19 [History]


Hydrocodone/Acetaminophen [Hydrocodon-Acetaminophen 5-325] 1 each PO Q4H PRN #

30 tablet 19 [Rx]


Ibuprofen [Motrin] 600 mg PO Q6H PRN  tablet 19 [Rx]


Montelukast [Singulair] 10 mg PO BEDTIME  tablet 19 [Rx]


diphenhydrAMINE [Benadryl] 25 mg PO Q6H PRN  cap 19 [Rx]











Past Medical History


HEENT History: Reports: Allergic Rhinitis, Hard of Hearing, Impaired Vision, 

Other (See Below)


Other HEENT History: wears contacts, left hearing aid


Cardiovascular History: Reports: Hypertension


Respiratory History: Reports: Asthma


Other Respiratory History: Uses Inhaler as needed.


Gastrointestinal History: Reports: GERD


Genitourinary History: Reports: None


OB/GYN History: Reports: Pregnancy


Musculoskeletal History: Reports: Neck Pain, Chronic


Neurological History: Reports: None


Psychiatric History: Reports: Anxiety


Endocrine/Metabolic History: Reports: Obesity/BMI 30+


Other Endocrine/Metabolic History: metabolic syndrome


Hematologic History: Reports: None


Immunologic History: Reports: None


Oncologic (Cancer) History: Reports: None


Dermatologic History: Reports: Cellulitis





- Infectious Disease History


Infectious Disease History: Reports: None





- Past Surgical History


Head Surgeries/Procedures: Reports: None


GI Surgical History: Reports: Cholecystectomy


Female  Surgical History: Reports:  Section


Neurological Surgical History: Reports: None


Musculoskeletal Surgical History: Reports: Arthroscopic Knee, Carpal Tunnel


Oncologic Surgical History: Reports: None


Dermatological Surgical History: Reports: None





Social & Family History





- Family History


Family Medical History: Noncontributory





- Caffeine Use


Caffeine Use: Reports: None





Review of Systems





- Review of Systems


Review Of Systems: ROS reveals no pertinent complaints other than HPI.





ED EXAM, GENERAL





- Physical Exam


Exam: See Below (See dictation)





Course





- Vital Signs


Last Recorded V/S: 


 Last Vital Signs











Temp  96.8 F   19 10:23


 


Pulse  85   19 12:01


 


Resp  14   19 12:01


 


BP  133/79   19 12:01


 


Pulse Ox  97   19 12:01














- Orders/Labs/Meds


Orders: 


 Active Orders 24 hr











 Category Date Time Status


 


 DME for Discharge [COMM] Stat Oth  19 11:50 Ordered











Labs: 


 Laboratory Tests











  19 Range/Units





  10:38 10:38 10:38 


 


WBC  7.78    (4.0-11.0)  K/uL


 


RBC  4.72    (4.30-5.90)  M/uL


 


Hgb  13.3    (12.0-16.0)  g/dL


 


Hct  40.8    (36.0-46.0)  %


 


MCV  86.4    (80.0-98.0)  fL


 


MCH  28.2    (27.0-32.0)  pg


 


MCHC  32.6    (31.0-37.0)  g/dL


 


RDW Std Deviation  46.8    (28.0-62.0)  fl


 


RDW Coeff of Karma  15    (11.0-15.0)  %


 


Plt Count  295    (150-400)  K/uL


 


MPV  9.10    (7.40-12.00)  fL


 


Neut % (Auto)  56.9    (48.0-80.0)  %


 


Lymph % (Auto)  36.4    (16.0-40.0)  %


 


Mono % (Auto)  4.0    (0.0-15.0)  %


 


Eos % (Auto)  2.3    (0.0-7.0)  %


 


Baso % (Auto)  0.4    (0.0-1.5)  %


 


Neut # (Auto)  4.4    (1.4-5.7)  K/uL


 


Lymph # (Auto)  2.8 H    (0.6-2.4)  K/uL


 


Mono # (Auto)  0.3    (0.0-0.8)  K/uL


 


Eos # (Auto)  0.2    (0.0-0.7)  K/uL


 


Baso # (Auto)  0.0    (0.0-0.1)  K/uL


 


Nucleated RBC %  0.0    /100WBC


 


Nucleated RBCs #  0    K/uL


 


ESR     (0-19)  mm/hr


 


INR   1.02   


 


Sodium    137  (136-145)  mmol/L


 


Potassium    3.7  (3.5-5.1)  mmol/L


 


Chloride    102  ()  mmol/L


 


Carbon Dioxide    24.1  (21.0-32.0)  mmol/L


 


BUN    15  (7.0-18.0)  mg/dL


 


Creatinine    0.8  (0.6-1.0)  mg/dL


 


Est Cr Clr Drug Dosing    71.71  mL/min


 


Estimated GFR (MDRD)    > 60.0  ml/min


 


Glucose    152 H  ()  mg/dL


 


Calcium    9.6  (8.5-10.1)  mg/dL


 


Total Bilirubin    0.4  (0.2-1.0)  mg/dL


 


AST    19  (15-37)  IU/L


 


ALT    30  (14-63)  IU/L


 


Alkaline Phosphatase    52  ()  U/L


 


C-Reactive Protein    <0.20  (0.00-0.90)  mg/dL


 


Total Protein    7.3  (6.4-8.2)  g/dL


 


Albumin    3.9  (3.4-5.0)  g/dL


 


Globulin    3.4  (2.6-4.0)  g/dL


 


Albumin/Globulin Ratio    1.1  (0.9-1.6)  














  19 Range/Units





  10:38 


 


WBC   (4.0-11.0)  K/uL


 


RBC   (4.30-5.90)  M/uL


 


Hgb   (12.0-16.0)  g/dL


 


Hct   (36.0-46.0)  %


 


MCV   (80.0-98.0)  fL


 


MCH   (27.0-32.0)  pg


 


MCHC   (31.0-37.0)  g/dL


 


RDW Std Deviation   (28.0-62.0)  fl


 


RDW Coeff of Karma   (11.0-15.0)  %


 


Plt Count   (150-400)  K/uL


 


MPV   (7.40-12.00)  fL


 


Neut % (Auto)   (48.0-80.0)  %


 


Lymph % (Auto)   (16.0-40.0)  %


 


Mono % (Auto)   (0.0-15.0)  %


 


Eos % (Auto)   (0.0-7.0)  %


 


Baso % (Auto)   (0.0-1.5)  %


 


Neut # (Auto)   (1.4-5.7)  K/uL


 


Lymph # (Auto)   (0.6-2.4)  K/uL


 


Mono # (Auto)   (0.0-0.8)  K/uL


 


Eos # (Auto)   (0.0-0.7)  K/uL


 


Baso # (Auto)   (0.0-0.1)  K/uL


 


Nucleated RBC %   /100WBC


 


Nucleated RBCs #   K/uL


 


ESR  9  (0-19)  mm/hr


 


INR   


 


Sodium   (136-145)  mmol/L


 


Potassium   (3.5-5.1)  mmol/L


 


Chloride   ()  mmol/L


 


Carbon Dioxide   (21.0-32.0)  mmol/L


 


BUN   (7.0-18.0)  mg/dL


 


Creatinine   (0.6-1.0)  mg/dL


 


Est Cr Clr Drug Dosing   mL/min


 


Estimated GFR (MDRD)   ml/min


 


Glucose   ()  mg/dL


 


Calcium   (8.5-10.1)  mg/dL


 


Total Bilirubin   (0.2-1.0)  mg/dL


 


AST   (15-37)  IU/L


 


ALT   (14-63)  IU/L


 


Alkaline Phosphatase   ()  U/L


 


C-Reactive Protein   (0.00-0.90)  mg/dL


 


Total Protein   (6.4-8.2)  g/dL


 


Albumin   (3.4-5.0)  g/dL


 


Globulin   (2.6-4.0)  g/dL


 


Albumin/Globulin Ratio   (0.9-1.6)  











Meds: 


Medications














Discontinued Medications














Generic Name Dose Route Start Last Admin





  Trade Name Freq  PRN Reason Stop Dose Admin


 


Hydrocodone Bitart/Acetaminophen  1 tab  19 10:28  19 10:39





  [place] 325-5 Mg  PO  19 10:29  1 tab





  ONETIME ONE   Administration





     





     





     





     


 


Ondansetron HCl  4 mg  19 10:28  19 10:41





  Zofran Odt  PO  19 10:29  4 mg





  ONETIME ONE   Administration





     





     





     





     














Departure





- Departure


Time of Disposition: 12:14


Disposition: Home, Self-Care 01


Clinical Impression: 


 Left leg pain








- Discharge Information


Referrals: 


PCP,Unknown [Primary Care Provider] - 


Forms:  ED Department Discharge


Additional Instructions: 


The following information is given to patients seen in the emergency department 

who are being discharged to home. This information is to outline your options 

for follow-up care. We provide all patients seen in our emergency department 

with a follow-up referral.





The need for follow-up, as well as the timing and circumstances, are variable 

depending upon the specifics of your emergency department visit.





If you don't have a primary care physician on staff, we will provide you with a 

referral. We always advise you to contact your personal physician following an 

emergency department visit to inform them of the circumstance of the visit and 

for follow-up with them and/or the need for any referrals to a consulting 

specialist.





The emergency department will also refer you to a specialist when appropriate. 

This referral assures that you have the opportunity for follow-up care with a 

specialist. All of these measure are taken in an effort to provide you with 

optimal care, which includes your follow-up.





Under all circumstances we always encourage you to contact your private 

physician who remains a resource for coordinating your care. When calling for 

follow-up care, please make the office aware that this follow-up is from your 

recent emergency room visit. If for any reason you are refused follow-up, 

please contact the [facility] Emergency 

Department at [phone] and asked to speak to the emergency department 

charge nurse.





[facility]


Primary Care


[address] [place] [id number]


Phone: [phone]


Fax: [phone]





[place] [address]


Phone: [phone]


Fax: [phone]





1. Rest, ice, elevate the affected extremity. Please wear the splint and use 

the crutches as directed.


2. Tylenol and/or Ibuprofen as needed for pain management. Norco for moderate 

to severe pain. Do not take this medication while driving.


3. Follow up with the Orthopedic provider as we discussed. Return to the ED as 

needed and as discussed.





- My Orders


Last 24 Hours: 


My Active Orders





19 11:50


DME for Discharge [COMM] Stat 














- Assessment/Plan


Last 24 Hours: 


My Active Orders





19 11:50


DME for Discharge [COMM] Stat

## 2019-09-17 NOTE — CR
INDICATION:



Pain left hip.



TECHNIQUE:



Three-view study pelvis and left hip.



FINDINGS:



No evidence of fracture or dislocation. No bone or soft tissue 

abnormalities.



IMPRESSION:



Negative radiographic examination of the pelvis and left hip.



Dictated by Alyce Miller MD @ Sep 17 2019 11:48AM



Signed by Dr. Alyce Miller @ Sep 17 2019 11:49AM

## 2020-02-10 ENCOUNTER — HOSPITAL ENCOUNTER (EMERGENCY)
Dept: HOSPITAL 56 - MW.ED | Age: 45
Discharge: HOME | End: 2020-02-10
Payer: COMMERCIAL

## 2020-02-10 DIAGNOSIS — I10: ICD-10-CM

## 2020-02-10 DIAGNOSIS — E66.9: ICD-10-CM

## 2020-02-10 DIAGNOSIS — J45.909: ICD-10-CM
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
## 2020-02-10 DIAGNOSIS — Z79.899: ICD-10-CM

## 2020-02-10 DIAGNOSIS — S69.92XA: ICD-10-CM

## 2020-02-10 DIAGNOSIS — K21.9: ICD-10-CM

## 2020-02-10 DIAGNOSIS — F41.9: ICD-10-CM

## 2020-02-10 DIAGNOSIS — S60.512A: Primary | ICD-10-CM

## 2020-02-10 NOTE — CR
Left hand: 3 views of the left hand were obtained.

 

Comparison: No prior hand exam.

 

No fracture, dislocation or other bony abnormality is seen.

 

Impression:

1.  No abnormality is appreciated on left hand exam.

 

Diagnostic code #1

 

Study was dictated in [place] Standard Time

## 2020-02-10 NOTE — EDM.PDOC
ED HPI GENERAL MEDICAL PROBLEM





- General


Chief Complaint: Upper Extremity Injury/Pain


Stated Complaint: INJURED RT HAND


Time Seen by Provider: 02/10/20 10:00


Source of Information: Reports: Patient


History Limitations: Reports: No Limitations





- History of Present Illness


INITIAL COMMENTS - FREE TEXT/NARRATIVE: 


HISTORY AND PHYSICAL:





History of present illness:


Patient is a 44-year-old female who resents to the ED today with concern of 

left hand injury that occurred just prior to arrival to the ED.  Patient states 

she was out walking to her vehicle and slipped on the ice/snow and landed 

backwards on her left hand.  Patient states she has had pain of the hand as 

well as the wrist since the fall.  Patient states she was not dizzy or 

lightheaded before falling and that there was an obvious patch of ice that she 

had slipped on.  Patient denies hitting her head or loss of consciousness.  

Patient denies any other symptoms or concerns.





Patient denies fever, chills, chest pain, shortness of breath, or cough. Denies 

headache, neck stiff ness, change in vision, syncope, or near syncope. Denies 

nausea, vomiting, abdominal pain, diarrhea, constipation, or dysuria. Has not 

noted any blood in urine or stool. Patient has been eating and drinking 

appropriately.





Review of systems: 


As per history of present illness and below otherwise all systems reviewed and 

negative.





Past medical history: 


As per history of present illness and as reviewed below otherwise 

noncontributory.





Surgical history: 


As per history of present illness and as reviewed below otherwise 

noncontributory.





Social history: 


See social history for further information





Family history: 


As per history of present illness and as reviewed below otherwise 

noncontributory.





Physical exam:


General: Patient is alert, oriented, and in no acute distress. Patient sitting 

comfortably on exam table.


HEENT: Atraumatic, normocephalic, pupils equal and reactive bilaterally, 

negative for conjunctival pallor or scleral icterus, mucous membranes moist, 

TMs normal bilaterally, throat clear, neck supple, nontender, trachea midline. 

No drooling or trismus noted. No meningeal signs. No hot potato voice noted. 


Lungs: Clear to auscultation, breath sounds equal bilaterally, chest nontender.


Heart: S1S2, regular rate and rhythm without overt murmur


Abdomen: Soft, nondistended, nontender. Negative for masses or 

hepatosplenomegaly. Negative for costovertebral tenderness.


Pelvis: Stable nontender.


Genitourinary: Deferred.


Rectal: Deferred.


Skin: Intact, warm, dry. No lesions or rashes noted.


Extremities: Negative for cords or calf pain. Neurovascular unremarkable. There 

are superficial abrasions over the thenar and hypothenar eminence of the left 

hand without bleeding.  Patient does have mild pain with palpation of the 

distal radius and ulna but no edema or erythema of the complete left upper 

extremity.  Patient does have full range of motion of the complete left upper 

extremity but does have pain with range of motion of the left wrist and thumb. 

Negative snuff box tenderness.  Radial pulses grossly intact of the left upper 

extremity with capillary refill less than 2 seconds.


Neuro: Awake, alert, oriented. Cranial nerves II through XII unremarkable. 

Cerebellum unremarkable. Motor and sensory unremarkable throughout. Exam 

nonfocal.





Notes:


Discussed importance for follow-up with an orthopedic provider or primary care 

provider.


Voices understanding and is agreeable to plan of care. Denies any further 

questions or concerns at this time.





Diagnostics:


hand and wrist XR (labwork offered but patient declines all diagnostics other 

than hand XR)





Therapeutics:


Wrist splint





Prescription:


None





Impression: 


Left wrist injury


Left hand injury


Superficial abrasion, left hand





Plan:


1. Rest, ice, elevate the affected extremity. You can apply ice 15 minutes on, 

15 minutes off. 


2. Tylenol and/or Ibuprofen as directed for pain management or discomfort. 


3. Follow up with the Orthopedic provide or primary care provider as discussed. 

Return to the ED as needed and as discussed.








Definitive disposition and diagnosis as appropriate pending reevaluation and 

review of above.





  ** L hand


Pain Score (Numeric/FACES): 8





- Related Data


 Allergies











Allergy/AdvReac Type Severity Reaction Status Date / Time


 


No Known Allergies Allergy   Verified 02/10/20 09:01











Home Meds: 


 Home Meds





hydroCHLOROthiazide [Hydrochlorothiazide] 12.5 mg PO DAILY #30 cap 18 [Rx]


Montelukast [Singulair] 1 tab PO DAILY 19 [History]


Pantoprazole [ProTONIX***] 1 tab PO DAILY 19 [History]


metFORMIN [Glucophage XR] 1 tab PO BID 19 [History]


Cetirizine [ZyrTEC] 10 mg PO BEDTIME 19 [History]


Levocetirizine Dihydrochloride [Xyzal] 5 mg PO DAILY 19 [History]


Sertraline HCl 100 mg PO DAILY 19 [History]


carvediloL [Carvedilol] 12.5 mg PO BEDTIME 19 [History]


Hydrocodone/Acetaminophen [Hydrocodon-Acetaminophen 5-325] 1 each PO Q4H PRN #

30 tablet 19 [Rx]


Ibuprofen [Motrin] 600 mg PO Q6H PRN  tablet 19 [Rx]


Montelukast [Singulair] 10 mg PO BEDTIME  tablet 19 [Rx]


diphenhydrAMINE [Benadryl] 25 mg PO Q6H PRN  cap 19 [Rx]


Acetaminophen/HYDROcodone [Norco 325-5 MG] 1 dose PO Q4H #20 tablet 19 [Rx

]











Past Medical History


HEENT History: Reports: Allergic Rhinitis, Hard of Hearing, Impaired Vision, 

Other (See Below)


Other HEENT History: wears contacts, left hearing aid


Cardiovascular History: Reports: Hypertension


Respiratory History: Reports: Asthma


Other Respiratory History: Uses Inhaler as needed.


Gastrointestinal History: Reports: GERD


Genitourinary History: Reports: None


OB/GYN History: Reports: Pregnancy


Musculoskeletal History: Reports: Neck Pain, Chronic


Neurological History: Reports: None


Psychiatric History: Reports: Anxiety


Endocrine/Metabolic History: Reports: Obesity/BMI 30+


Other Endocrine/Metabolic History: metabolic syndrome


Hematologic History: Reports: None


Immunologic History: Reports: None


Oncologic (Cancer) History: Reports: None


Dermatologic History: Reports: Cellulitis





- Infectious Disease History


Infectious Disease History: Reports: Chicken Pox, Measles, Mumps


Other Infectious Disease History: Staph





- Past Surgical History


Head Surgeries/Procedures: Reports: None


GI Surgical History: Reports: Cholecystectomy


Female  Surgical History: Reports:  Section


Neurological Surgical History: Reports: None


Musculoskeletal Surgical History: Reports: Arthroscopic Knee, Carpal Tunnel


Oncologic Surgical History: Reports: None


Dermatological Surgical History: Reports: None





Social & Family History





- Family History


Family Medical History: Noncontributory





- Tobacco Use


Smoking Status *Q: Never Smoker


Second Hand Smoke Exposure: No





- Caffeine Use


Caffeine Use: Reports: None





- Recreational Drug Use


Recreational Drug Use: No





Review of Systems





- Review of Systems


Review Of Systems: Comprehensive ROS is negative, except as noted in HPI.





ED EXAM, GENERAL





- Physical Exam


Exam: See Below (see dictation)





Course





- Vital Signs


Last Recorded V/S: 


 Last Vital Signs











Temp  97.8 F   02/10/20 09:01


 


Pulse  90   02/10/20 09:01


 


Resp  16   02/10/20 09:01


 


BP  151/85 H  02/10/20 09:01


 


Pulse Ox  97   02/10/20 09:01














- Orders/Labs/Meds


Orders: 


 Active Orders 24 hr











 Category Date Time Status


 


 DME for Discharge [COMM] Stat Oth  02/10/20 11:00 Ordered














Departure





- Departure


Time of Disposition: 11:04


Disposition: Home, Self-Care 01


Clinical Impression: 


 Superficial abrasion





Left wrist injury


Qualifiers:


 Encounter type: initial encounter Qualified Code(s): S69.92XA - Unspecified 

injury of left wrist, hand and finger(s), initial encounter





Injury of left hand


Qualifiers:


 Encounter type: initial encounter Qualified Code(s): S69.92XA - Unspecified 

injury of left wrist, hand and finger(s), initial encounter








- Discharge Information


Referrals: 


Beth Caldwell MD [Primary Care Provider] - 


Forms:  ED Department Discharge


Additional Instructions: 


The following information is given to patients seen in the emergency department 

who are being discharged to home. This information is to outline your options 

for follow-up care. We provide all patients seen in our emergency department 

with a follow-up referral.





The need for follow-up, as well as the timing and circumstances, are variable 

depending upon the specifics of your emergency department visit.





If you don't have a primary care physician on staff, we will provide you with a 

referral. We always advise you to contact your personal physician following an 

emergency department visit to inform them of the circumstance of the visit and 

for follow-up with them and/or the need for any referrals to a consulting 

specialist.





The emergency department will also refer you to a specialist when appropriate. 

This referral assures that you have the opportunity for follow-up care with a 

specialist. All of these measure are taken in an effort to provide you with 

optimal care, which includes your follow-up.





Under all circumstances we always encourage you to contact your private 

physician who remains a resource for coordinating your care. When calling for 

follow-up care, please make the office aware that this follow-up is from your 

recent emergency room visit. If for any reason you are refused follow-up, 

please contact the [facility] Emergency 

Department at [phone] and asked to speak to the emergency department 

charge nurse.





[facility]


Primary Care


[address] [place] [address]


Phone: [phone]


Fax: [phone]





[address] [address]


Phone: [phone]


Fax: [phone]





[facility]


Specialty Care - Orthopedic Clinic


 Professional Building


[address] [place] [address], [address]


[place] [address]


Phone: [phone]


 





 1. Rest, ice, elevate the affected extremity. You can apply ice 15 minutes on, 

15 minutes off. 


2. Tylenol and/or Ibuprofen as directed for pain management or discomfort. 


3. Follow up with the Orthopedic provide or primary care provider as discussed. 

Return to the ED as needed and as discussed.














Sepsis Event Note





- Evaluation


Sepsis Screening Result: No Definite Risk





- Focused Exam


Vital Signs: 


 Vital Signs











  Temp Pulse Resp BP Pulse Ox


 


 02/10/20 09:01  97.8 F  90  16  151/85 H  97











Date Exam was Performed: 02/10/20


Time Exam was Performed: 11:04





- My Orders


Last 24 Hours: 


My Active Orders





02/10/20 11:00


DME for Discharge [COMM] Stat 














- Assessment/Plan


Last 24 Hours: 


My Active Orders





02/10/20 11:00


DME for Discharge [COMM] Stat

## 2020-02-10 NOTE — CR
Left wrist: 3 views left wrist were obtained.

 

Comparison: No prior wrist exam is available.

 

Joint spaces are preserved.  No fracture, dislocation or other bony 

abnormality is identified.

 

Impression:

1.  No abnormality is appreciated on 3 view left wrist exam.

 

Diagnostic code #1

 

Study was dictated in [place] Standard Time